# Patient Record
Sex: FEMALE
[De-identification: names, ages, dates, MRNs, and addresses within clinical notes are randomized per-mention and may not be internally consistent; named-entity substitution may affect disease eponyms.]

---

## 2024-03-20 PROBLEM — Z00.00 ENCOUNTER FOR PREVENTIVE HEALTH EXAMINATION: Status: ACTIVE | Noted: 2024-03-20

## 2024-03-21 ENCOUNTER — APPOINTMENT (OUTPATIENT)
Dept: ORTHOPEDIC SURGERY | Facility: CLINIC | Age: 39
End: 2024-03-21
Payer: COMMERCIAL

## 2024-03-21 VITALS — BODY MASS INDEX: 20.83 KG/M2 | WEIGHT: 122 LBS | HEIGHT: 64 IN

## 2024-03-21 PROCEDURE — 99203 OFFICE O/P NEW LOW 30 MIN: CPT

## 2024-03-21 PROCEDURE — 73110 X-RAY EXAM OF WRIST: CPT | Mod: RT

## 2024-03-21 NOTE — IMAGING
[de-identified] : RIGHT HAND skin intact. no swelling. TTP to volar distal forearm. wrist ROM: mild limited extension, scant flexion. held in neutral rotation, no pronation/supination. + pain with wrist ROM. good EPL, FPL. good finger extension, flex to full fist. good finger abduction and adduction.  SILT to median, ulnar, radial distribution.  palpable radial pulse, brisk cap refill all digits. no triggering.   XRAYS OF RIGHT WRIST: no acute displaced fracture or dislocation.

## 2024-03-21 NOTE — HISTORY OF PRESENT ILLNESS
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 Pain radiates up forearm. Pain is constant, worse with use of hand, including ADLs. Denies N/T.  Tried OTC wrist brace with minimal relief, caused more pain and stiffness.  Tried salt soaks, ice, nsaids/ibuprofen without much relief. Reports PCP ordered rheumatologic labs, which were unremarkable.  pmhx: none Pshx: none [FreeTextEntry1] : RIGHT wrist  [] : no [FreeTextEntry5] : AMEENA lehman [RHD] 38 year old female is here today c/o RIGHT wrist pain x 3 months w/o injury. reports sharp ulnar > radial wrist pain with prolonged activity and with pushing. denies prior injury to wrist. denies numbness/tingling. +ibuprofen PRN

## 2024-03-21 NOTE — ASSESSMENT
[FreeTextEntry1] : Unclear etiology of pain. Exam limited due to pain/guarding. - MRI R wrist.  F/u after MRI.

## 2024-04-01 ENCOUNTER — APPOINTMENT (OUTPATIENT)
Dept: ORTHOPEDIC SURGERY | Facility: CLINIC | Age: 39
End: 2024-04-01
Payer: COMMERCIAL

## 2024-04-01 DIAGNOSIS — M25.531 PAIN IN RIGHT WRIST: ICD-10-CM

## 2024-04-01 PROCEDURE — 99213 OFFICE O/P EST LOW 20 MIN: CPT

## 2024-04-11 ENCOUNTER — APPOINTMENT (OUTPATIENT)
Dept: ORTHOPEDIC SURGERY | Facility: CLINIC | Age: 39
End: 2024-04-11